# Patient Record
Sex: MALE | Race: WHITE | HISPANIC OR LATINO | URBAN - METROPOLITAN AREA
[De-identification: names, ages, dates, MRNs, and addresses within clinical notes are randomized per-mention and may not be internally consistent; named-entity substitution may affect disease eponyms.]

---

## 2024-07-16 NOTE — H&P PST ADULT - ASSESSMENT
Impression:  57 yo male with newly reduced EF for LHC.    Risk Assessments:  ASA:  Mallampati:  Bleeding Risk:  Creatinine:  GFR:      Plan:  -plan for LHC via RA vs FA  -patient seen and examined  -confirmed appropriate NPO duration  -TAMMY prophylaxis 250cc NS bolus ordered  -ECG and Labs reviewed  -Aspirin 81mg po pre-cath  -procedure discussed with patient; risks and benefits explained, questions answered  -consent obtained by attending IC    Risks, benefits, and alternatives reviewed.  Risks including but not limited to MI, death, stroke, bleeding, infection, vessel injury, hematoma, renal failure, allergic reaction, urgent open heart surgery, restenosis and stent thrombosis were reviewed.  All questions answered.  Patient is agreeable to proceed.   Pt. assessed, appropriate for sedation, pt. educated regarding the plan for Versed/Fentanyl as needed.   Impression:  59 yo male with newly reduced EF for LHC.    Risk Assessments:  ASA:3  Mallampati:2  Bleeding Risk:0.9  Creatinine:0.8  GFR:103    Pt assessed, appropriate for sedation, pt educated regarding the plan for Versed/Fentanyl as needed.      Plan:  -plan for LHC via RA vs FA  -patient seen and examined  -confirmed appropriate NPO duration  -TAMMY prophylaxis 250cc NS bolus ordered  -ECG and Labs reviewed  -Aspirin 81mg po pre-cath  -procedure discussed with patient; risks and benefits explained, questions answered  -consent obtained by attending IC    Risks, benefits, and alternatives reviewed.  Risks including but not limited to MI, death, stroke, bleeding, infection, vessel injury, hematoma, renal failure, allergic reaction, urgent open heart surgery, restenosis and stent thrombosis were reviewed.  All questions answered.  Patient is agreeable to proceed.   Pt. assessed, appropriate for sedation, pt. educated regarding the plan for Versed/Fentanyl as needed.

## 2024-07-16 NOTE — H&P PST ADULT - HISTORY OF PRESENT ILLNESS
Department of Cardiology                                                                  [facility]/[place] [address] [address]                                                            Telephone: [phone]. Fax:[phone]                                                                             Pre- Procedure H + P/Progress Note      HPI:  57 yo male with h/o cardiomyopathy, HTN who presents with uncomfortable sensation in the chest and palpitations that lasts for seconds. Echo showed LVEF 40% and NST with equivocal perfusion with apical cleft and diaphragmatic attenuation. Due to new reduced  EF presents for C.      Symptoms:        Angina (Class):        Ischemic Symptoms:     Heart Failure:        Systolic/Diastolic/Combined:        NYHA Class (within 2 weeks):     Assessment of LVEF:       EF: 40%       Assessed by: TTE       Date: 7/3/24    Prior Cardiac Interventions:          Noninvasive Testing:   Stress Test: Date: 7/5/24       Protocol: Regadenoson       Duration of Exercise:        Symptoms: Negative       EKG Changes: Negative       DTS:        Myocardial Imaging: Equivocal       Risk Assessment (Low, Medium, High):     Echo: 7/3/24  EF 40%  Grade 1 diastolic dysfunction          Associated Risk Factors:        Cerebrovascular Disease: N/A       Chronic Lung Disease: N/A       Peripheral Arterial Disease: N/A       Chronic Kidney Disease (if yes, what is GFR): N/A       Uncontrolled Diabetes (if yes, what is HgbA1C or FBS): N/A       Poorly Controlled Hypertension (if yes, what is SBP): N/A       Morbid Obesity (if yes, what is BMI): N/A       History of Recent Ventricular Arrhythmia: N/A       Inability to Ambulate Safely: N/A       Need for Therapeutic Anticoagulation: N/A       Antiplatelet or Contrast Allergy: N/A       Fraility: Mild/Moderate/Severe    Antianginal Therapies:        Beta Blockers:         Calcium Channel Blockers:        Long Acting Nitrates:        Ranexa:     	  MEDICATIONS:                    ROS: as stated above, otherwise negative    PHYSICAL EXAM:        T(C): --  HR: --  BP: --  RR: --  SpO2: --  Wt(kg): --      I&O's Summary      Daily     Daily     TELEMETRY: 	      ECG:  	    LABS:	 	                        Tnl:    Lipid Profile:   TC  TG  LDL  HDL    HgA1c:     proBNP:     TSH:                                                                                  Department of Cardiology                                                                  [facility]/[place] [address] [address]                                                            Telephone: [phone]. Fax:[phone]                                                                             Pre- Procedure H + P/Progress Note      HPI:  57 yo male with h/o cardiomyopathy, HTN who presents with uncomfortable sensation in the chest and palpitations that lasts for seconds. Echo showed LVEF 40% and NST with equivocal perfusion with apical cleft and diaphragmatic attenuation. Due to new reduced  EF presents for C.      Symptoms:        Angina (Class):        Ischemic Symptoms: none    Heart Failure:        Systolic/Diastolic/Combined:        NYHA Class (within 2 weeks):     Assessment of LVEF:       EF: 40%       Assessed by: TTE       Date: 7/3/24    Prior Cardiac Interventions: NA         Noninvasive Testing:   Stress Test: Date: 7/5/24       Protocol: Regadenoson       Duration of Exercise:        Symptoms: Negative       EKG Changes: Negative       DTS:        Myocardial Imaging: Equivocal       Risk Assessment (Low, Medium, High):     Echo: 7/3/24  EF 40%  Grade 1 diastolic dysfunction          Associated Risk Factors:        Cerebrovascular Disease: N/A       Chronic Lung Disease: N/A       Peripheral Arterial Disease: N/A       Chronic Kidney Disease (if yes, what is GFR): N/A       Uncontrolled Diabetes (if yes, what is HgbA1C or FBS): N/A       Poorly Controlled Hypertension (if yes, what is SBP): N/A       Morbid Obesity (if yes, what is BMI): N/A       History of Recent Ventricular Arrhythmia: N/A       Inability to Ambulate Safely: N/A       Need for Therapeutic Anticoagulation: N/A       Antiplatelet or Contrast Allergy: N/A       Fraility: Mild/Moderate/Severe    Antianginal Therapies:        Beta Blockers:  n       Calcium Channel Blockers: n       Long Acting Nitrates: n       Ranexa: n    	  MEDICATIONS:      Home Medications:  aspirin 81 mg oral tablet, chewable: 1 tab(s) chewed once a day (17 Jul 2024 08:47)  Entresto 24 mg-26 mg oral tablet: 1 tab(s) orally 2 times a day (17 Jul 2024 08:47)                ROS: as stated above, otherwise negative    PHYSICAL EXAM:  Neuro: A&O X3  Lungs: CTA  CV: RRR no murmur  Ext: + palp pulses X4      TELEMETRY: 	  SB    ECG:  	SB    LABS:	 	                        14.2   7.14  )-----------( 267      ( 17 Jul 2024 08:39 )             41.7   07-17    136  |  102  |  9.1  ----------------------------<  99  4.6   |  24.0  |  0.81    Ca    8.3<L>      17 Jul 2024 08:39  Mg     1.9     07-17

## 2024-07-17 ENCOUNTER — OUTPATIENT (OUTPATIENT)
Dept: OUTPATIENT SERVICES | Facility: HOSPITAL | Age: 59
LOS: 1 days | End: 2024-07-17
Payer: COMMERCIAL

## 2024-07-17 VITALS
SYSTOLIC BLOOD PRESSURE: 123 MMHG | OXYGEN SATURATION: 100 % | DIASTOLIC BLOOD PRESSURE: 92 MMHG | TEMPERATURE: 98 F | RESPIRATION RATE: 16 BRPM | HEART RATE: 62 BPM

## 2024-07-17 VITALS
SYSTOLIC BLOOD PRESSURE: 125 MMHG | RESPIRATION RATE: 16 BRPM | DIASTOLIC BLOOD PRESSURE: 85 MMHG | HEART RATE: 58 BPM | OXYGEN SATURATION: 100 %

## 2024-07-17 DIAGNOSIS — I42.9 CARDIOMYOPATHY, UNSPECIFIED: ICD-10-CM

## 2024-07-17 LAB
ANION GAP SERPL CALC-SCNC: 10 MMOL/L — SIGNIFICANT CHANGE UP (ref 5–17)
BASOPHILS # BLD AUTO: 0.06 K/UL — SIGNIFICANT CHANGE UP (ref 0–0.2)
BASOPHILS NFR BLD AUTO: 0.8 % — SIGNIFICANT CHANGE UP (ref 0–2)
BUN SERPL-MCNC: 9.1 MG/DL — SIGNIFICANT CHANGE UP (ref 8–20)
CALCIUM SERPL-MCNC: 8.3 MG/DL — LOW (ref 8.4–10.5)
CHLORIDE SERPL-SCNC: 102 MMOL/L — SIGNIFICANT CHANGE UP (ref 96–108)
CO2 SERPL-SCNC: 24 MMOL/L — SIGNIFICANT CHANGE UP (ref 22–29)
CREAT SERPL-MCNC: 0.81 MG/DL — SIGNIFICANT CHANGE UP (ref 0.5–1.3)
EGFR: 103 ML/MIN/1.73M2 — SIGNIFICANT CHANGE UP
EOSINOPHIL # BLD AUTO: 0.22 K/UL — SIGNIFICANT CHANGE UP (ref 0–0.5)
EOSINOPHIL NFR BLD AUTO: 3.1 % — SIGNIFICANT CHANGE UP (ref 0–6)
GLUCOSE SERPL-MCNC: 99 MG/DL — SIGNIFICANT CHANGE UP (ref 70–99)
HCT VFR BLD CALC: 41.7 % — SIGNIFICANT CHANGE UP (ref 39–50)
HGB BLD-MCNC: 14.2 G/DL — SIGNIFICANT CHANGE UP (ref 13–17)
IMM GRANULOCYTES NFR BLD AUTO: 0.3 % — SIGNIFICANT CHANGE UP (ref 0–0.9)
LYMPHOCYTES # BLD AUTO: 1.96 K/UL — SIGNIFICANT CHANGE UP (ref 1–3.3)
LYMPHOCYTES # BLD AUTO: 27.5 % — SIGNIFICANT CHANGE UP (ref 13–44)
MAGNESIUM SERPL-MCNC: 1.9 MG/DL — SIGNIFICANT CHANGE UP (ref 1.6–2.6)
MCHC RBC-ENTMCNC: 32.2 PG — SIGNIFICANT CHANGE UP (ref 27–34)
MCHC RBC-ENTMCNC: 34.1 GM/DL — SIGNIFICANT CHANGE UP (ref 32–36)
MCV RBC AUTO: 94.6 FL — SIGNIFICANT CHANGE UP (ref 80–100)
MONOCYTES # BLD AUTO: 0.65 K/UL — SIGNIFICANT CHANGE UP (ref 0–0.9)
MONOCYTES NFR BLD AUTO: 9.1 % — SIGNIFICANT CHANGE UP (ref 2–14)
NEUTROPHILS # BLD AUTO: 4.23 K/UL — SIGNIFICANT CHANGE UP (ref 1.8–7.4)
NEUTROPHILS NFR BLD AUTO: 59.2 % — SIGNIFICANT CHANGE UP (ref 43–77)
PLATELET # BLD AUTO: 267 K/UL — SIGNIFICANT CHANGE UP (ref 150–400)
POTASSIUM SERPL-MCNC: 4.6 MMOL/L — SIGNIFICANT CHANGE UP (ref 3.5–5.3)
POTASSIUM SERPL-SCNC: 4.6 MMOL/L — SIGNIFICANT CHANGE UP (ref 3.5–5.3)
RBC # BLD: 4.41 M/UL — SIGNIFICANT CHANGE UP (ref 4.2–5.8)
RBC # FLD: 11.7 % — SIGNIFICANT CHANGE UP (ref 10.3–14.5)
SODIUM SERPL-SCNC: 136 MMOL/L — SIGNIFICANT CHANGE UP (ref 135–145)
WBC # BLD: 7.14 K/UL — SIGNIFICANT CHANGE UP (ref 3.8–10.5)
WBC # FLD AUTO: 7.14 K/UL — SIGNIFICANT CHANGE UP (ref 3.8–10.5)

## 2024-07-17 PROCEDURE — 93458 L HRT ARTERY/VENTRICLE ANGIO: CPT | Mod: 26

## 2024-07-17 PROCEDURE — 99152 MOD SED SAME PHYS/QHP 5/>YRS: CPT

## 2024-07-17 PROCEDURE — 36415 COLL VENOUS BLD VENIPUNCTURE: CPT

## 2024-07-17 PROCEDURE — C1769: CPT

## 2024-07-17 PROCEDURE — 85025 COMPLETE CBC W/AUTO DIFF WBC: CPT

## 2024-07-17 PROCEDURE — C1894: CPT

## 2024-07-17 PROCEDURE — 83735 ASSAY OF MAGNESIUM: CPT

## 2024-07-17 PROCEDURE — 80048 BASIC METABOLIC PNL TOTAL CA: CPT

## 2024-07-17 PROCEDURE — 93458 L HRT ARTERY/VENTRICLE ANGIO: CPT

## 2024-07-17 PROCEDURE — C1887: CPT

## 2024-07-17 PROCEDURE — 93005 ELECTROCARDIOGRAM TRACING: CPT

## 2024-07-17 PROCEDURE — 93010 ELECTROCARDIOGRAM REPORT: CPT

## 2024-07-17 RX ORDER — SODIUM CHLORIDE 0.9 % (FLUSH) 0.9 %
250 SYRINGE (ML) INJECTION ONCE
Refills: 0 | Status: COMPLETED | OUTPATIENT
Start: 2024-07-17 | End: 2024-07-17

## 2024-07-17 RX ORDER — SACUBITRIL AND VALSARTAN 97; 103 MG/1; MG/1
1 TABLET, FILM COATED ORAL
Refills: 0 | DISCHARGE

## 2024-07-17 RX ORDER — ASPIRIN 325 MG/1
81 TABLET, FILM COATED ORAL ONCE
Refills: 0 | Status: COMPLETED | OUTPATIENT
Start: 2024-07-17 | End: 2024-07-17

## 2024-07-17 RX ORDER — ASPIRIN 325 MG/1
1 TABLET, FILM COATED ORAL
Refills: 0 | DISCHARGE

## 2024-07-17 RX ORDER — SODIUM CHLORIDE 0.9 % (FLUSH) 0.9 %
250 SYRINGE (ML) INJECTION ONCE
Refills: 0 | Status: DISCONTINUED | OUTPATIENT
Start: 2024-07-17 | End: 2024-07-31

## 2024-07-17 RX ADMIN — ASPIRIN 81 MILLIGRAM(S): 325 TABLET, FILM COATED ORAL at 10:41

## 2024-07-17 RX ADMIN — Medication 250 MILLILITER(S): at 10:41

## 2024-07-17 NOTE — DISCHARGE NOTE PROVIDER - NSDCCPCAREPLAN_GEN_ALL_CORE_FT
PRINCIPAL DISCHARGE DIAGNOSIS  Diagnosis: Cardiomyopathy  Assessment and Plan of Treatment: cont current medical management

## 2024-07-17 NOTE — DISCHARGE NOTE PROVIDER - CARE PROVIDER_API CALL
Mahesh Lopez  Cardiology  [facility] [place] [address]  Phone: [phone]  Fax: [phone]  Follow Up Time:

## 2024-07-17 NOTE — DISCHARGE NOTE PROVIDER - HOSPITAL COURSE
58 year old male with h/o cardiomyopathy and chest discomfort.    Now s/p LHC which revealed normal coronary arteries.  EDP=15.

## 2024-07-17 NOTE — DISCHARGE NOTE PROVIDER - NSDCMRMEDTOKEN_GEN_ALL_CORE_FT
aspirin 81 mg oral tablet, chewable: 1 tab(s) chewed once a day  Entresto 24 mg-26 mg oral tablet: 1 tab(s) orally 2 times a day

## 2024-07-17 NOTE — DISCHARGE NOTE PROVIDER - NSDCCPTREATMENT_GEN_ALL_CORE_FT
PRINCIPAL PROCEDURE  Procedure: Left heart cardiac cath  Findings and Treatment: normal coronary arteries

## 2024-07-17 NOTE — DISCHARGE NOTE NURSING/CASE MANAGEMENT/SOCIAL WORK - PATIENT PORTAL LINK FT
You can access the FollowMyHealth Patient Portal offered by [facility] by registering at the following website: [email or web address]. By joining [facility]’s FollowMyHealth portal, you will also be able to view your health information using other applications (apps) compatible with our system.

## 2024-07-17 NOTE — PROGRESS NOTE ADULT - SUBJECTIVE AND OBJECTIVE BOX
Now s/p LHC via right radial artery with radial band in place . Procedure performed by Dr. Velásquez.  Pt arrived to recovery in NAD and HDS.  RRA access site stable, no bleed/hematoma, distal pulse +.    Procedure results: S/P LHC which revealed normal coronary arteries.  EDP=15    Medication received during procedure:  Versed: 1 mg  Fentanyl: 25 mcg  Heparin: 3000 u  Omnipaque: 26 ml    Exam:   Neuro: A&O X.  MATA=  CV: RRR  Lungs: CTA  Ext: + palp pulses.  Vascular access: RRA site stable. No bleeding/hematoma/ecchymosis.  + cap refill       Plan:  -Formal cath report pending  -Post procedure management/monitoring per protocol  -Access site precautions  -Radial compression band removal at 1230  -Repeat ECG if any clinical indication or change on tele  -NS 250mL bolus post cath   -Continue current medical therapy  -F/U outpt in 1-2 weeks with Cardiologist Dr. Lopez  -Discharge today when criteria met

## 2024-07-17 NOTE — DISCHARGE NOTE NURSING/CASE MANAGEMENT/SOCIAL WORK - NSDCPEFALRISK_GEN_ALL_CORE
For information on Fall & Injury Prevention, visit: [email or web address] OR  [email or web address] OR  https://www.cdc.gov/steadi/patient.html

## 2024-09-13 PROBLEM — Z00.00 ENCOUNTER FOR PREVENTIVE HEALTH EXAMINATION: Status: ACTIVE | Noted: 2024-09-13

## 2024-09-20 ENCOUNTER — APPOINTMENT (OUTPATIENT)
Dept: HEART FAILURE | Facility: CLINIC | Age: 59
End: 2024-09-20

## 2024-09-20 RX ORDER — SACUBITRIL AND VALSARTAN 24; 26 MG/1; MG/1
24-26 TABLET, FILM COATED ORAL TWICE DAILY
Qty: 180 | Refills: 3 | Status: ACTIVE | COMMUNITY
Start: 2024-09-20

## 2024-09-20 NOTE — DISCUSSION/SUMMARY
[FreeTextEntry1] : 59 y/o male with h/o cardiomyopathy, chest discomfort, chronic systolic HF ACC/AHA stage C, NICMP, LVEF %, LVIDd cm.  Plan as above

## 2024-09-20 NOTE — PHYSICAL EXAM
[Moves all extremities] : moves all extremities [Alert and Oriented] : alert and oriented [Well Developed] : well developed [No Acute Distress] : no acute distress [Clear Lung Fields] : clear lung fields [No Respiratory Distress] : no respiratory distress  [de-identified] : did not assess gait [de-identified] : warm, dry

## 2024-09-20 NOTE — HISTORY OF PRESENT ILLNESS
[FreeTextEntry1] : 57y/o male with h/o NICM cardiomyopathy, chest discomfort, chronic diastolic HF ACC/AHA stage C, NICMP, LVEF %, LVIDd cm   Mr. Cerrato was first diagnosed with heart failure in ______. He has been following with (referring cardiologist) and has had a serious of testing including a LHC that revealed normal coronary arteries.  -prior hospitalizations .. Family history(any cardiac relevance), any substance use relevant (smoking, cocaine, illegal drugs)   The patient comes to the office today to establish care. He denies overt HF symptoms specifically CP, palpitations, SOB, dizziness/LH, LE edema, orthopnea and PND. No recent hospitalizations or ED visits.

## 2024-09-20 NOTE — END OF VISIT
[FreeTextEntry4] :  I, Judy Guerra, am scribing for and in the presence of  in the following sections HISTORY OF PRESENT ILLNESSS, PAST MEDICAL/FAMILY/SOCIAL HISTORY; REVIEW OF SYSTEMS; VITAL SIGNS; PHYSICAL EXAM; ASSESSMENT/PLAN   [FreeTextEntry3] :  I, Shamika Ruiz , personally performed the services described in this documentation. All medical record entries made by the scribe were at my direction and in my presence. I have reviewed the chart and agree that the record reflects my personal performance and is accurate and complete.

## 2024-09-20 NOTE — ASSESSMENT
[FreeTextEntry1] : #Chronic systolic HF ACC/AHA stage C, NICMP, LVEF %, LVIDd cm Etiology: NICM, LVEF 40%, LVIDd 4.4cm Clinically euvolemic, warm extremities -GDMT: Cw Entresto 24-26mg. -Diuretics: Not currently on standing diuretics. -Labs 7/17 reviewed  -Device: Not indicated -HF education provided including lifestyle changes (low Na diet, fluid restriction, increase physical activity), current clinical condition, natural progression and prognosis.NICM  -Pt informed to notify office if >3lb/1 day or >5 lb/1 week weight gain  #HTN (borderline)  -on Aspirin 81mg  RTO in

## 2024-09-20 NOTE — CARDIOLOGY SUMMARY
[de-identified] : 7/17/24: SB 53bpm [de-identified] : 7/8/24 Holter: avg HR 78bpm, ventricular run up to 7 beats at 138bpm.  [de-identified] : 7/5/24 NST: rare PVCs, normal LVEF, negative for stress induced ischemia, normal LV size/function, no stress induced arrhythmias. [de-identified] : 7/3/24 TTE: 40%, LVIDd 4.4cm, LVOT VTI 2.0, RV normal size/function, RVSP 12.06cm, RAP 3mmHg, mild MR [de-identified] : 7/17/24 LHC: normal coronary arteries and NICM

## 2024-09-20 NOTE — END OF VISIT
[FreeTextEntry3] :  I, Shamika Ruiz , personally performed the services described in this documentation. All medical record entries made by the scribe were at my direction and in my presence. I have reviewed the chart and agree that the record reflects my personal performance and is accurate and complete.   [FreeTextEntry4] :  I, Judy Guerra, am scribing for and in the presence of  in the following sections HISTORY OF PRESENT ILLNESSS, PAST MEDICAL/FAMILY/SOCIAL HISTORY; REVIEW OF SYSTEMS; VITAL SIGNS; PHYSICAL EXAM; ASSESSMENT/PLAN

## 2024-09-20 NOTE — CARDIOLOGY SUMMARY
[de-identified] : 7/17/24: SB 53bpm [de-identified] : 7/8/24 Holter: avg HR 78bpm, ventricular run up to 7 beats at 138bpm.  [de-identified] : 7/5/24 NST: rare PVCs, normal LVEF, negative for stress induced ischemia, normal LV size/function, no stress induced arrhythmias. [de-identified] : 7/3/24 TTE: 40%, LVIDd 4.4cm, LVOT VTI 2.0, RV normal size/function, RVSP 12.06cm, RAP 3mmHg, mild MR [de-identified] : 7/17/24 LHC: normal coronary arteries and NICM

## 2024-09-20 NOTE — DISCUSSION/SUMMARY
[FreeTextEntry1] : 57 y/o male with h/o cardiomyopathy, chest discomfort, chronic systolic HF ACC/AHA stage C, NICMP, LVEF %, LVIDd cm.  Plan as above

## 2024-09-20 NOTE — PHYSICAL EXAM
[Moves all extremities] : moves all extremities [Alert and Oriented] : alert and oriented [Well Developed] : well developed [No Acute Distress] : no acute distress [Clear Lung Fields] : clear lung fields [No Respiratory Distress] : no respiratory distress  [de-identified] : did not assess gait [de-identified] : warm, dry

## 2024-09-20 NOTE — HISTORY OF PRESENT ILLNESS
[FreeTextEntry1] : 59y/o male with h/o NICM cardiomyopathy, chest discomfort, chronic diastolic HF ACC/AHA stage C, NICMP, LVEF %, LVIDd cm   Mr. Cerrato was first diagnosed with heart failure in ______. He has been following with (referring cardiologist) and has had a serious of testing including a LHC that revealed normal coronary arteries.  -prior hospitalizations .. Family history(any cardiac relevance), any substance use relevant (smoking, cocaine, illegal drugs)   The patient comes to the office today to establish care. He denies overt HF symptoms specifically CP, palpitations, SOB, dizziness/LH, LE edema, orthopnea and PND. No recent hospitalizations or ED visits.